# Patient Record
Sex: MALE | Race: WHITE | NOT HISPANIC OR LATINO
[De-identification: names, ages, dates, MRNs, and addresses within clinical notes are randomized per-mention and may not be internally consistent; named-entity substitution may affect disease eponyms.]

---

## 2017-09-28 ENCOUNTER — APPOINTMENT (OUTPATIENT)
Dept: OTOLARYNGOLOGY | Facility: CLINIC | Age: 72
End: 2017-09-28
Payer: MEDICARE

## 2017-09-28 VITALS
BODY MASS INDEX: 36.51 KG/M2 | WEIGHT: 255 LBS | HEIGHT: 70 IN | SYSTOLIC BLOOD PRESSURE: 151 MMHG | DIASTOLIC BLOOD PRESSURE: 76 MMHG | HEART RATE: 78 BPM

## 2017-09-28 DIAGNOSIS — R43.9 UNSPECIFIED DISTURBANCES OF SMELL AND TASTE: ICD-10-CM

## 2017-09-28 DIAGNOSIS — J33.9 NASAL POLYP, UNSPECIFIED: ICD-10-CM

## 2017-09-28 PROCEDURE — 31231 NASAL ENDOSCOPY DX: CPT

## 2017-09-28 PROCEDURE — 92557 COMPREHENSIVE HEARING TEST: CPT

## 2017-09-28 PROCEDURE — 69210 REMOVE IMPACTED EAR WAX UNI: CPT

## 2017-09-28 PROCEDURE — 92567 TYMPANOMETRY: CPT

## 2017-09-28 PROCEDURE — 99204 OFFICE O/P NEW MOD 45 MIN: CPT | Mod: 25

## 2017-09-28 RX ORDER — METHYLPREDNISOLONE 4 MG/1
4 TABLET ORAL
Qty: 1 | Refills: 3 | Status: ACTIVE | COMMUNITY
Start: 2017-09-28 | End: 1900-01-01

## 2017-09-28 RX ORDER — FLUTICASONE PROPIONATE 50 UG/1
50 SPRAY, METERED NASAL DAILY
Qty: 16 | Refills: 0 | Status: ACTIVE | COMMUNITY
Start: 2017-09-28 | End: 1900-01-01

## 2017-10-01 ENCOUNTER — FORM ENCOUNTER (OUTPATIENT)
Age: 72
End: 2017-10-01

## 2017-10-02 ENCOUNTER — APPOINTMENT (OUTPATIENT)
Dept: CT IMAGING | Facility: CLINIC | Age: 72
End: 2017-10-02
Payer: MEDICARE

## 2017-10-02 ENCOUNTER — OUTPATIENT (OUTPATIENT)
Dept: OUTPATIENT SERVICES | Facility: HOSPITAL | Age: 72
LOS: 1 days | End: 2017-10-02
Payer: MEDICARE

## 2017-10-02 DIAGNOSIS — R43.9 UNSPECIFIED DISTURBANCES OF SMELL AND TASTE: ICD-10-CM

## 2017-10-02 PROCEDURE — 70486 CT MAXILLOFACIAL W/O DYE: CPT

## 2017-10-02 PROCEDURE — 70486 CT MAXILLOFACIAL W/O DYE: CPT | Mod: 26

## 2017-10-26 ENCOUNTER — MEDICATION RENEWAL (OUTPATIENT)
Age: 72
End: 2017-10-26

## 2017-10-26 RX ORDER — FLUTICASONE PROPIONATE 50 UG/1
50 SPRAY, METERED NASAL DAILY
Qty: 16 | Refills: 4 | Status: ACTIVE | COMMUNITY
Start: 2017-10-26 | End: 1900-01-01

## 2018-03-26 ENCOUNTER — MEDICATION RENEWAL (OUTPATIENT)
Age: 73
End: 2018-03-26

## 2018-03-26 RX ORDER — FLUTICASONE PROPIONATE 50 UG/1
50 SPRAY, METERED NASAL DAILY
Qty: 16 | Refills: 4 | Status: ACTIVE | COMMUNITY
Start: 2018-03-26 | End: 1900-01-01

## 2018-10-05 ENCOUNTER — APPOINTMENT (OUTPATIENT)
Dept: OTOLARYNGOLOGY | Facility: CLINIC | Age: 73
End: 2018-10-05
Payer: MEDICARE

## 2018-10-05 VITALS
SYSTOLIC BLOOD PRESSURE: 156 MMHG | BODY MASS INDEX: 36.36 KG/M2 | WEIGHT: 254 LBS | HEIGHT: 70 IN | DIASTOLIC BLOOD PRESSURE: 75 MMHG | HEART RATE: 64 BPM

## 2018-10-05 DIAGNOSIS — I95.1 ORTHOSTATIC HYPOTENSION: ICD-10-CM

## 2018-10-05 DIAGNOSIS — R42 DIZZINESS AND GIDDINESS: ICD-10-CM

## 2018-10-05 PROCEDURE — 99214 OFFICE O/P EST MOD 30 MIN: CPT

## 2018-12-09 ENCOUNTER — TRANSCRIPTION ENCOUNTER (OUTPATIENT)
Age: 73
End: 2018-12-09

## 2018-12-17 ENCOUNTER — APPOINTMENT (OUTPATIENT)
Dept: OTOLARYNGOLOGY | Facility: CLINIC | Age: 73
End: 2018-12-17
Payer: MEDICARE

## 2018-12-17 VITALS
DIASTOLIC BLOOD PRESSURE: 84 MMHG | BODY MASS INDEX: 36.36 KG/M2 | HEIGHT: 70 IN | WEIGHT: 254 LBS | SYSTOLIC BLOOD PRESSURE: 145 MMHG | HEART RATE: 86 BPM

## 2018-12-17 PROCEDURE — 92557 COMPREHENSIVE HEARING TEST: CPT

## 2018-12-17 PROCEDURE — 92567 TYMPANOMETRY: CPT

## 2018-12-17 PROCEDURE — 99214 OFFICE O/P EST MOD 30 MIN: CPT | Mod: 25

## 2018-12-17 RX ORDER — LORATADINE 10 MG/1
10 TABLET ORAL
Qty: 7 | Refills: 0 | Status: ACTIVE | COMMUNITY
Start: 2018-12-09

## 2018-12-17 RX ORDER — ESOMEPRAZOLE MAGNESIUM 40 MG/1
40 CAPSULE, DELAYED RELEASE ORAL
Qty: 90 | Refills: 0 | Status: ACTIVE | COMMUNITY
Start: 2018-03-24

## 2018-12-17 RX ORDER — METHYLPREDNISOLONE 4 MG/1
4 TABLET ORAL
Qty: 1 | Refills: 0 | Status: ACTIVE | COMMUNITY
Start: 2018-12-17 | End: 1900-01-01

## 2018-12-17 RX ORDER — PHENTERMINE HYDROCHLORIDE 15 MG/1
15 CAPSULE ORAL
Qty: 30 | Refills: 0 | Status: ACTIVE | COMMUNITY
Start: 2018-11-17

## 2018-12-17 RX ORDER — AMOXICILLIN 875 MG/1
875 TABLET, FILM COATED ORAL
Qty: 14 | Refills: 0 | Status: ACTIVE | COMMUNITY
Start: 2018-12-09

## 2018-12-21 ENCOUNTER — APPOINTMENT (OUTPATIENT)
Dept: OTOLARYNGOLOGY | Facility: CLINIC | Age: 73
End: 2018-12-21
Payer: MEDICARE

## 2018-12-21 VITALS
WEIGHT: 254 LBS | HEART RATE: 59 BPM | BODY MASS INDEX: 36.36 KG/M2 | HEIGHT: 70 IN | SYSTOLIC BLOOD PRESSURE: 158 MMHG | DIASTOLIC BLOOD PRESSURE: 78 MMHG

## 2018-12-21 PROCEDURE — 99214 OFFICE O/P EST MOD 30 MIN: CPT

## 2018-12-21 PROCEDURE — 92567 TYMPANOMETRY: CPT

## 2018-12-21 PROCEDURE — 92557 COMPREHENSIVE HEARING TEST: CPT

## 2019-01-04 ENCOUNTER — TRANSCRIPTION ENCOUNTER (OUTPATIENT)
Age: 74
End: 2019-01-04

## 2019-01-04 ENCOUNTER — APPOINTMENT (OUTPATIENT)
Dept: OTOLARYNGOLOGY | Facility: CLINIC | Age: 74
End: 2019-01-04
Payer: MEDICARE

## 2019-01-04 VITALS
BODY MASS INDEX: 36.36 KG/M2 | HEIGHT: 70 IN | WEIGHT: 254 LBS | DIASTOLIC BLOOD PRESSURE: 80 MMHG | SYSTOLIC BLOOD PRESSURE: 146 MMHG | HEART RATE: 62 BPM

## 2019-01-04 DIAGNOSIS — H91.20 SUDDEN IDIOPATHIC HEARING LOSS, UNSPECIFIED EAR: ICD-10-CM

## 2019-01-04 PROCEDURE — 92557 COMPREHENSIVE HEARING TEST: CPT

## 2019-01-04 PROCEDURE — 92567 TYMPANOMETRY: CPT

## 2019-01-04 PROCEDURE — 99214 OFFICE O/P EST MOD 30 MIN: CPT

## 2019-04-26 ENCOUNTER — APPOINTMENT (OUTPATIENT)
Dept: OTOLARYNGOLOGY | Facility: CLINIC | Age: 74
End: 2019-04-26
Payer: MEDICARE

## 2019-04-26 VITALS
HEART RATE: 78 BPM | SYSTOLIC BLOOD PRESSURE: 146 MMHG | DIASTOLIC BLOOD PRESSURE: 76 MMHG | BODY MASS INDEX: 36.51 KG/M2 | HEIGHT: 70 IN | WEIGHT: 255 LBS

## 2019-04-26 DIAGNOSIS — Z99.89 OBSTRUCTIVE SLEEP APNEA (ADULT) (PEDIATRIC): ICD-10-CM

## 2019-04-26 DIAGNOSIS — R09.81 NASAL CONGESTION: ICD-10-CM

## 2019-04-26 DIAGNOSIS — G47.33 OBSTRUCTIVE SLEEP APNEA (ADULT) (PEDIATRIC): ICD-10-CM

## 2019-04-26 DIAGNOSIS — J34.2 DEVIATED NASAL SEPTUM: ICD-10-CM

## 2019-04-26 PROCEDURE — 99214 OFFICE O/P EST MOD 30 MIN: CPT

## 2019-04-26 NOTE — ASSESSMENT
[FreeTextEntry1] : Patient follows up doing really well during his return to baseline normal for him totally resolving a sudden sensory neural hearing loss. Rest of his workup was negative he knows she needs to lose weight follow up with us as needed.

## 2019-04-26 NOTE — PHYSICAL EXAM
[Midline] : trachea located in midline position [Normal] : no rashes [de-identified] : small fluid behind right TM

## 2019-04-26 NOTE — HISTORY OF PRESENT ILLNESS
[de-identified] : Patient is here today with resolution of sudden sensorineural hearing loss bilaterally. He no longer has any changes in hearing and feels he is back to baseline. He does not have any dizziness or ringing in the ears and is not having any issues with balance. He is overall doing well

## 2019-04-29 ENCOUNTER — TRANSCRIPTION ENCOUNTER (OUTPATIENT)
Age: 74
End: 2019-04-29

## 2019-05-12 ENCOUNTER — EMERGENCY (EMERGENCY)
Facility: HOSPITAL | Age: 74
LOS: 1 days | Discharge: HOME | End: 2019-05-12
Attending: EMERGENCY MEDICINE
Payer: MEDICARE

## 2019-05-12 VITALS
DIASTOLIC BLOOD PRESSURE: 99 MMHG | TEMPERATURE: 98 F | WEIGHT: 255.96 LBS | SYSTOLIC BLOOD PRESSURE: 192 MMHG | HEART RATE: 67 BPM | HEIGHT: 70.5 IN | RESPIRATION RATE: 18 BRPM | OXYGEN SATURATION: 97 %

## 2019-05-12 VITALS
OXYGEN SATURATION: 96 % | RESPIRATION RATE: 18 BRPM | HEART RATE: 77 BPM | SYSTOLIC BLOOD PRESSURE: 164 MMHG | TEMPERATURE: 98 F | DIASTOLIC BLOOD PRESSURE: 90 MMHG

## 2019-05-12 PROCEDURE — 73030 X-RAY EXAM OF SHOULDER: CPT

## 2019-05-12 PROCEDURE — 23650 CLTX SHO DSLC W/MNPJ WO ANES: CPT | Mod: LT

## 2019-05-12 PROCEDURE — 70450 CT HEAD/BRAIN W/O DYE: CPT | Mod: 26

## 2019-05-12 PROCEDURE — 99284 EMERGENCY DEPT VISIT MOD MDM: CPT | Mod: 57,25

## 2019-05-12 PROCEDURE — 99284 EMERGENCY DEPT VISIT MOD MDM: CPT | Mod: 25

## 2019-05-12 PROCEDURE — 73030 X-RAY EXAM OF SHOULDER: CPT | Mod: 26,LT

## 2019-05-12 PROCEDURE — 70450 CT HEAD/BRAIN W/O DYE: CPT

## 2019-05-12 PROCEDURE — 23650 CLTX SHO DSLC W/MNPJ WO ANES: CPT | Mod: 54

## 2019-05-12 RX ORDER — TRAMADOL HYDROCHLORIDE 50 MG/1
1 TABLET ORAL
Qty: 15 | Refills: 0
Start: 2019-05-12

## 2019-05-12 RX ORDER — TRAMADOL HYDROCHLORIDE 50 MG/1
50 TABLET ORAL ONCE
Refills: 0 | Status: DISCONTINUED | OUTPATIENT
Start: 2019-05-12 | End: 2019-05-12

## 2019-05-12 RX ORDER — IBUPROFEN 200 MG
600 TABLET ORAL ONCE
Refills: 0 | Status: COMPLETED | OUTPATIENT
Start: 2019-05-12 | End: 2019-05-12

## 2019-05-12 RX ORDER — ACETAMINOPHEN 500 MG
975 TABLET ORAL ONCE
Refills: 0 | Status: COMPLETED | OUTPATIENT
Start: 2019-05-12 | End: 2019-05-12

## 2019-05-12 RX ORDER — DIAZEPAM 5 MG
5 TABLET ORAL ONCE
Refills: 0 | Status: DISCONTINUED | OUTPATIENT
Start: 2019-05-12 | End: 2019-05-12

## 2019-05-12 RX ORDER — OXYCODONE HYDROCHLORIDE 5 MG/1
5 TABLET ORAL ONCE
Refills: 0 | Status: DISCONTINUED | OUTPATIENT
Start: 2019-05-12 | End: 2019-05-12

## 2019-05-12 RX ADMIN — Medication 975 MILLIGRAM(S): at 17:32

## 2019-05-12 RX ADMIN — OXYCODONE HYDROCHLORIDE 5 MILLIGRAM(S): 5 TABLET ORAL at 18:15

## 2019-05-12 RX ADMIN — Medication 600 MILLIGRAM(S): at 18:57

## 2019-05-12 RX ADMIN — TRAMADOL HYDROCHLORIDE 50 MILLIGRAM(S): 50 TABLET ORAL at 19:29

## 2019-05-12 RX ADMIN — Medication 5 MILLIGRAM(S): at 17:32

## 2019-05-12 RX ADMIN — OXYCODONE HYDROCHLORIDE 5 MILLIGRAM(S): 5 TABLET ORAL at 17:32

## 2019-05-12 RX ADMIN — Medication 975 MILLIGRAM(S): at 18:15

## 2019-05-12 NOTE — ED ADULT NURSE NOTE - CHIEF COMPLAINT QUOTE
SENT FROM [facility] CARE FOR DISLOCATED SHOULDER, S/P SLIP AND FALL. HIT HEAD, NO LOC NO BLOOD THINNERS

## 2019-05-12 NOTE — ED ADULT TRIAGE NOTE - CHIEF COMPLAINT QUOTE
SENT FROM [facility] CARE FOR DISLOCATED SHOULDER, S/P SLIP AND FALL. HIT HEAD, NO LOC NO BLOOD THINNERS

## 2019-05-12 NOTE — ED ADULT NURSE NOTE - NSIMPLEMENTINTERV_GEN_ALL_ED
Implemented All Fall Risk Interventions:  [place] to call system. Call bell, personal items and telephone within reach. Instruct patient to call for assistance. Room bathroom lighting operational. Non-slip footwear when patient is off stretcher. Physically safe environment: no spills, clutter or unnecessary equipment. Stretcher in lowest position, wheels locked, appropriate side rails in place. Provide visual cue, wrist band, yellow gown, etc. Monitor gait and stability. Monitor for mental status changes and reorient to person, place, and time. Review medications for side effects contributing to fall risk. Reinforce activity limits and safety measures with patient and family.

## 2019-05-12 NOTE — ED PROVIDER NOTE - PHYSICAL EXAMINATION
NAD, Hypertensive, Afebrile, + PERRL. + Left forehead mild swelling with tender. No spinal tender. No rib or CVA tender. Lungs clear. + Left shoulder tender, deform and unable to ROMs. N/V- intact. ABD soft, non tender. No pelvic or hip tender. Neuro -intact with steady gait.

## 2019-05-12 NOTE — ED PROVIDER NOTE - NSFOLLOWUPINSTRUCTIONS_ED_ALL_ED_FT
Keep continue your current medications.  Keep the shoulder immobilizer.  Take Tylenol 500mg or 650mg every 6hours for pain as needed.  Motrin (Advil or Ibuprofen) 600mg every 8hours for pain with food.  Tramadol 50mg every 6hours for severe pain with cautions of drowsiness and constipation.  Stool softener as needed.  Follow up with your DrConnor for reevaluation of head injury in 2-3days.  Follow up with your orthopedist or Dr. on a list, call tomorrow for an appointment.  Return for any concerns or worsening symptoms.

## 2019-05-12 NOTE — ED PROVIDER NOTE - CLINICAL SUMMARY MEDICAL DECISION MAKING FREE TEXT BOX
Maryanne: 73 year old male s/p mechanical fall with left shoulder pain. xray showing dislocation of left shoulder and no fracture from UC. Patient also with left frontaltemporal hematoma s/p fall. will reduce, s/p reduction xray, ct head, reasess.

## 2019-05-12 NOTE — ED PROVIDER NOTE - OBJECTIVE STATEMENT
72yo male pt with PMHx of HTN, HLD, BPH, ambulatory c/o left shoulder dislocation and head injury s/p mechanical fall 72yo male pt with PMHx of HTN, HLD, BPH, ambulatory c/o left shoulder dislocation and head injury s/p mechanical fall today. Pt stated he tripped and fell on left shoulder. Pt's evaluated by UC and sent to ED for dislocation. Denies LOC, headache or dizziness. Denies visual changes or N/V. Denies neck or back pain. Denies radiating pain, sensory changes or weakness to extremities. Denies Cp/SOB/ABD pain or pelvic/ hip pain. Denies fever, chills or recent sickness.

## 2019-05-12 NOTE — ED ADULT NURSE NOTE - OBJECTIVE STATEMENT
74 y/o male seen [facility] 72 y/o male seen in [facility] c/o left shoulder dislocation and head injury s/p mechanical fall.  Pt reports he tripped and fell, hitting head but no LOC.  Pt went to urgent care and was told to come to ER for left shoulder dislocation.  Pt came to ER with left arm sling.  Pt denies any other injury.  Left arm with limited ROM due to pain.   No acute respiratory distress noted. 72 y/o male seen in [facility] c/o left shoulder dislocation and head injury s/p mechanical fall.  Pt reports he tripped and fell, hitting head but no LOC.  Pt went to urgent care and was told to come to ER for left shoulder dislocation.  Pt came to ER with left arm sling.  Pt able to move his fingers.  Sensation intact.  Pt denies any other injury.   No acute respiratory distress noted.

## 2019-05-12 NOTE — ED PROVIDER NOTE - CARE PLAN
Principal Discharge DX:	Shoulder dislocation, left, initial encounter  Secondary Diagnosis:	Head injury

## 2019-11-05 ENCOUNTER — APPOINTMENT (OUTPATIENT)
Dept: OTOLARYNGOLOGY | Facility: CLINIC | Age: 74
End: 2019-11-05
Payer: MEDICARE

## 2019-11-05 VITALS
SYSTOLIC BLOOD PRESSURE: 157 MMHG | HEIGHT: 70 IN | HEART RATE: 70 BPM | WEIGHT: 255 LBS | BODY MASS INDEX: 36.51 KG/M2 | DIASTOLIC BLOOD PRESSURE: 85 MMHG

## 2019-11-05 DIAGNOSIS — H65.91 UNSPECIFIED NONSUPPURATIVE OTITIS MEDIA, RIGHT EAR: ICD-10-CM

## 2019-11-05 PROCEDURE — 92567 TYMPANOMETRY: CPT

## 2019-11-05 PROCEDURE — 92557 COMPREHENSIVE HEARING TEST: CPT

## 2019-11-05 PROCEDURE — 99214 OFFICE O/P EST MOD 30 MIN: CPT

## 2019-11-05 NOTE — PHYSICAL EXAM
[Midline] : trachea located in midline position [Normal] : no rashes [de-identified] : small fluid behind right TM

## 2019-11-05 NOTE — HISTORY OF PRESENT ILLNESS
[de-identified] : PAtient is here toady with some hearing loss int he right ear that has been going on for a few days and he feels like he needs an ear cleaning. He thinks that the hearing has been progressively worsening but then got bad a few days ago. he is due to get a shunt on thursday due to water around the brain. He feels muffling in his hearing and clogging and minor pressure on the right. He did take sudafed yesterday

## 2019-11-05 NOTE — ASSESSMENT
[FreeTextEntry1] : Patient recently diagnosed with hydrocephalus scheduled for a shunt procedure on Thursday this week once again this felt diminished hearing due to a little bit of fluid in his ears audiogram confirmed bilateral mixed hearing loss in the past his responded to steroids and hesitant to give him anything prior to his procedure especially since he most likely will get steroids intraoperatively and this should be a self-limiting I do recommend he followup with us in the future to see if the fluid was also oversewn with drainage may be necessary here\par

## 2023-09-25 ENCOUNTER — NON-APPOINTMENT (OUTPATIENT)
Age: 78
End: 2023-09-25

## 2023-09-26 ENCOUNTER — APPOINTMENT (OUTPATIENT)
Dept: OTOLARYNGOLOGY | Facility: CLINIC | Age: 78
End: 2023-09-26
Payer: MEDICARE

## 2023-09-26 VITALS
HEIGHT: 70 IN | TEMPERATURE: 98.2 F | SYSTOLIC BLOOD PRESSURE: 134 MMHG | BODY MASS INDEX: 34.36 KG/M2 | DIASTOLIC BLOOD PRESSURE: 65 MMHG | WEIGHT: 240 LBS | HEART RATE: 56 BPM

## 2023-09-26 DIAGNOSIS — H90.3 SENSORINEURAL HEARING LOSS, BILATERAL: ICD-10-CM

## 2023-09-26 DIAGNOSIS — H61.23 IMPACTED CERUMEN, BILATERAL: ICD-10-CM

## 2023-09-26 PROCEDURE — 92567 TYMPANOMETRY: CPT

## 2023-09-26 PROCEDURE — 92557 COMPREHENSIVE HEARING TEST: CPT

## 2023-09-26 PROCEDURE — 69210 REMOVE IMPACTED EAR WAX UNI: CPT

## 2023-09-26 PROCEDURE — 99204 OFFICE O/P NEW MOD 45 MIN: CPT | Mod: 25
